# Patient Record
Sex: MALE
[De-identification: names, ages, dates, MRNs, and addresses within clinical notes are randomized per-mention and may not be internally consistent; named-entity substitution may affect disease eponyms.]

---

## 2021-05-17 PROBLEM — Z00.00 ENCOUNTER FOR PREVENTIVE HEALTH EXAMINATION: Status: ACTIVE | Noted: 2021-05-17

## 2021-05-18 ENCOUNTER — APPOINTMENT (OUTPATIENT)
Dept: OTOLARYNGOLOGY | Facility: CLINIC | Age: 51
End: 2021-05-18
Payer: SELF-PAY

## 2021-05-18 VITALS — HEIGHT: 68 IN | WEIGHT: 200 LBS | BODY MASS INDEX: 30.31 KG/M2

## 2021-05-18 DIAGNOSIS — Z82.49 FAMILY HISTORY OF ISCHEMIC HEART DISEASE AND OTHER DISEASES OF THE CIRCULATORY SYSTEM: ICD-10-CM

## 2021-05-18 DIAGNOSIS — H65.01 ACUTE SEROUS OTITIS MEDIA, RIGHT EAR: ICD-10-CM

## 2021-05-18 DIAGNOSIS — Z83.3 FAMILY HISTORY OF DIABETES MELLITUS: ICD-10-CM

## 2021-05-18 DIAGNOSIS — Z86.79 PERSONAL HISTORY OF OTHER DISEASES OF THE CIRCULATORY SYSTEM: ICD-10-CM

## 2021-05-18 DIAGNOSIS — Z78.9 OTHER SPECIFIED HEALTH STATUS: ICD-10-CM

## 2021-05-18 DIAGNOSIS — Z86.39 PERSONAL HISTORY OF OTHER ENDOCRINE, NUTRITIONAL AND METABOLIC DISEASE: ICD-10-CM

## 2021-05-18 PROCEDURE — 99203 OFFICE O/P NEW LOW 30 MIN: CPT | Mod: 25

## 2021-05-18 PROCEDURE — 31575 DIAGNOSTIC LARYNGOSCOPY: CPT

## 2021-05-18 RX ORDER — FAMOTIDINE 40 MG/1
40 TABLET, FILM COATED ORAL
Qty: 30 | Refills: 6 | Status: ACTIVE | COMMUNITY
Start: 2021-05-18 | End: 1900-01-01

## 2021-05-18 RX ORDER — OMEPRAZOLE 40 MG/1
40 CAPSULE, DELAYED RELEASE ORAL
Qty: 30 | Refills: 3 | Status: ACTIVE | COMMUNITY
Start: 2021-05-18 | End: 1900-01-01

## 2021-05-19 PROBLEM — H65.01 NON-RECURRENT ACUTE SEROUS OTITIS MEDIA OF RIGHT EAR: Status: ACTIVE | Noted: 2021-05-19

## 2021-05-19 NOTE — HISTORY OF PRESENT ILLNESS
[de-identified] : SHAMIKA SUNG is a 51 year man with a history of CRS for 2-3 years. 4 weeks ago in [place] he lost sense of smell and taste. He has frontal and vertex headache. His right ear is clogged.

## 2021-05-19 NOTE — REVIEW OF SYSTEMS
[Nasal Congestion] : nasal congestion [Sense Of Smell Problem] : sense of smell problem [Negative] : Heme/Lymph [de-identified] : no sense of smell and taste

## 2021-05-19 NOTE — ASSESSMENT
[FreeTextEntry1] : SHAMIKA SUNG ahs 3-4 year history of CRS. he gets infections several times/year. he recently developed congestion, headache and loss of smell. He has severe reflux. I suggested and discussed in detail a strict reflux diet and gave the patient a handout.\par I am recommending Prilosec 40 mg 30-40 minutes before breakfast on an empty stomach.\par I am recommending Pepcid 40mg  before bedtime.\par \par He will use Rhinocort spray. I will all with culture and treat with antibiotics and probably steroids.\par \par

## 2021-05-24 LAB — BACTERIA FLD CULT: ABNORMAL

## 2021-06-03 ENCOUNTER — APPOINTMENT (OUTPATIENT)
Dept: OTOLARYNGOLOGY | Facility: CLINIC | Age: 51
End: 2021-06-03
Payer: SELF-PAY

## 2021-06-03 VITALS — WEIGHT: 200 LBS | HEIGHT: 68 IN | TEMPERATURE: 98.7 F | BODY MASS INDEX: 30.31 KG/M2

## 2021-06-03 PROCEDURE — 99213 OFFICE O/P EST LOW 20 MIN: CPT

## 2021-06-03 NOTE — ASSESSMENT
[FreeTextEntry1] : SHAMIKA SUNG is  a  and has several months of sinus infection and anosmia in spite of antibiotics and steroids.. I will send him for Sinus CT.

## 2021-06-03 NOTE — HISTORY OF PRESENT ILLNESS
[de-identified] : SHAMIKA SUNG is a 51 year man with a history of CRS for 2-3 years. several weeks ago in [place] he lost sense of smell and taste. He has frontal and vertex headache. His right ear is clogged. \par  He used Doxycycline and prednisone taper and his smell returned for a short time.\par

## 2021-06-03 NOTE — REVIEW OF SYSTEMS
[Sense Of Smell Problem] : sense of smell problem [Negative] : Heme/Lymph [Patient Intake Form Reviewed] : Patient intake form was reviewed

## 2021-06-05 ENCOUNTER — RESULT REVIEW (OUTPATIENT)
Age: 51
End: 2021-06-05

## 2021-06-05 ENCOUNTER — OUTPATIENT (OUTPATIENT)
Dept: OUTPATIENT SERVICES | Facility: HOSPITAL | Age: 51
LOS: 1 days | End: 2021-06-05

## 2021-06-05 ENCOUNTER — APPOINTMENT (OUTPATIENT)
Dept: CT IMAGING | Facility: CLINIC | Age: 51
End: 2021-06-05
Payer: COMMERCIAL

## 2021-06-05 PROCEDURE — 70486 CT MAXILLOFACIAL W/O DYE: CPT | Mod: 26

## 2021-06-09 ENCOUNTER — APPOINTMENT (OUTPATIENT)
Dept: OTOLARYNGOLOGY | Facility: CLINIC | Age: 51
End: 2021-06-09

## 2021-06-09 LAB — BACTERIA FLD CULT: NORMAL

## 2021-06-15 ENCOUNTER — APPOINTMENT (OUTPATIENT)
Dept: OTOLARYNGOLOGY | Facility: CLINIC | Age: 51
End: 2021-06-15
Payer: SELF-PAY

## 2021-06-15 VITALS — TEMPERATURE: 97.2 F | WEIGHT: 200 LBS | HEIGHT: 68 IN | BODY MASS INDEX: 30.31 KG/M2

## 2021-06-15 DIAGNOSIS — R43.0 ANOSMIA: ICD-10-CM

## 2021-06-15 DIAGNOSIS — J01.80 OTHER ACUTE SINUSITIS: ICD-10-CM

## 2021-06-15 PROCEDURE — 99213 OFFICE O/P EST LOW 20 MIN: CPT

## 2021-06-15 RX ORDER — DOXYCYCLINE HYCLATE 100 MG/1
100 CAPSULE ORAL
Qty: 20 | Refills: 0 | Status: COMPLETED | COMMUNITY
Start: 2021-05-26 | End: 2021-06-15

## 2021-06-15 RX ORDER — PREDNISONE 10 MG/1
10 TABLET ORAL
Qty: 18 | Refills: 0 | Status: COMPLETED | COMMUNITY
Start: 2021-05-26 | End: 2021-06-15

## 2021-06-15 NOTE — HISTORY OF PRESENT ILLNESS
[de-identified] : SHAMIKA SUNG is a 51 year man with a history of CRS with pansinusitis and anosmia.

## 2021-06-15 NOTE — REVIEW OF SYSTEMS
[Negative] : Heme/Lymph [de-identified] : sense of smell problem  [Patient Intake Form Reviewed] : Patient intake form was reviewed

## 2021-06-15 NOTE — HISTORY OF PRESENT ILLNESS
[de-identified] : SHAMIKA SUNG is a 51 year man with a history of CRS with pansinusitis and anosmia.

## 2021-06-15 NOTE — ASSESSMENT
[FreeTextEntry1] : SHAMIKA SUNG has continued symptoms. he may be a bit improved. I suggested and discussed FESS. He wants to hold off. I suggest he use Budesonide. I will call with culture.

## 2021-07-01 LAB — BACTERIA FLD CULT: ABNORMAL
